# Patient Record
Sex: FEMALE | Race: WHITE | NOT HISPANIC OR LATINO | URBAN - METROPOLITAN AREA
[De-identification: names, ages, dates, MRNs, and addresses within clinical notes are randomized per-mention and may not be internally consistent; named-entity substitution may affect disease eponyms.]

---
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
## 2018-01-10 NOTE — MISCELLANEOUS
Message   Recorded as Task   Date: 08/18/2017 01:23 PM, Created By: Eleazar Aguilera   Task Name: Result Follow Up   Assigned To: FARTUN GYN,Team   Regarding Patient: Karena Novak, Status: In Progress   CommentGlla Blind - 18 Aug 2017 1:23 PM     TASK CREATED  Neg gc/chl  Please notify patient and advise of results by her cell number, per her request     Thanks   Mojgan Walter - 18 Aug 2017 1:26 PM     TASK IN PROGRESS   Mojgan Walter - 18 Aug 2017 1:28 PM     TASK EDITED  Patient is aware of culture results  Active Problems    1  Dysmenorrhea (625 3) (N94 6)   2  Encounter for general counseling and advice on contraceptive management (V25 09)   (Z30 09)   3  Encounter for gynecological examination without abnormal finding (V72 31) (Z01 419)   4  HPV vaccine counseling (V65 49) (Z71 89)   5  Personal history of exposure to potentially hazardous body fluids (V15 85) (Z77 21)    Current Meds   1  Norethin Ace-Eth Estrad-FE 1-20 MG-MCG Oral Tablet (Loestrin Fe 1/20); TAKE 1   TABLET DAILY AS DIRECTED; Therapy: [id number] to (Evaluate:[id number])  Requested for: [id number]; Last   Rx:[id number] Ordered    Allergies    1   No Known Drug Allergies    Signatures   Electronically signed by : Jasmeet Larry, ; Aug 18 2017  1:28PM EST                       (Author)

## 2018-01-14 VITALS
SYSTOLIC BLOOD PRESSURE: 108 MMHG | WEIGHT: 124 LBS | BODY MASS INDEX: 24.35 KG/M2 | HEIGHT: 60 IN | DIASTOLIC BLOOD PRESSURE: 62 MMHG

## 2018-09-12 ENCOUNTER — TELEPHONE (OUTPATIENT)
Dept: OBGYN CLINIC | Facility: CLINIC | Age: 18
End: 2018-09-12

## 2018-12-21 ENCOUNTER — TELEPHONE (OUTPATIENT)
Dept: OBGYN CLINIC | Facility: CLINIC | Age: 18
End: 2018-12-21